# Patient Record
(demographics unavailable — no encounter records)

---

## 2024-10-23 NOTE — HISTORY OF PRESENT ILLNESS
[de-identified] : COUGH / FEVER  [FreeTextEntry6] : Fever x5 days and non-stop coughing.  Went to urgent care diagnosed with viral uri symptoms.  Tolerating po intake. Normal UOP.  Mother treated for bronchitis

## 2024-10-23 NOTE — HISTORY OF PRESENT ILLNESS
[de-identified] : COUGH / FEVER  [FreeTextEntry6] : Fever x5 days and non-stop coughing.  Went to urgent care diagnosed with viral uri symptoms.  Tolerating po intake. Normal UOP.  Mother treated for bronchitis

## 2024-10-23 NOTE — DISCUSSION/SUMMARY
[FreeTextEntry1] : Sent for Chest X-Ray - Results in Right Lobe Pneumonia  RVP Sent out  Antibiotics as prescribed Drink plenty of fluids  Discussed use/avoidance of OTC cough meds. Reviewed reasons to seek immediate care and follow up if condition worsens or does not improve.  Return for follow up in 1 week. Discussed signs and symptoms that would required immediate care. Mother expressed understanding. All questions answered. Caretaker understands and agrees with plan. If (+) new or worsening symptoms or (+) parental concern - return to office

## 2025-01-31 NOTE — PHYSICAL EXAM
[Acute Distress] : no acute distress [Alert] : alert [EOMI] : grossly EOMI [Conjuctival Injection] : conjunctival injection [Increased Tearing] : increased tearing [Discharge] : discharge [Supple] : supple [FROM] : full passive range of motion [Clear to Auscultation Bilaterally] : clear to auscultation bilaterally [Regular Rate and Rhythm] : regular rate and rhythm [Normal S1, S2 audible] : normal S1, S2 audible [Murmur] : no murmur [Soft] : soft [Tender] : nontender [Distended] : nondistended [Normal Bowel Sounds] : normal bowel sounds [Hepatosplenomegaly] : no hepatosplenomegaly [No Abnormal Lymph Nodes Palpated] : no abnormal lymph nodes palpated [Moves All Extremities x 4] : moves all extremities x4 [Warm, Well Perfused x4] : warm, well perfused x4 [Capillary Refill <2s] : capillary refill < 2s [Normotonic] : normotonic [NL] : warm, clear [Warm] : warm [Clear] : clear [FreeTextEntry5] : lexis

## 2025-01-31 NOTE — HISTORY OF PRESENT ILLNESS
[de-identified] : EYE REDNESS/ COUGH  [FreeTextEntry6] : PER MOM, PT HAS BEEN SICK FOR A FEW DAYS  EYES CRUSTED STARTING LAST NIGHT  TYNEOL & MUCINEX GIVEN YESTERDAY  NO MEDS TODAY  NKDA

## 2025-01-31 NOTE — DISCUSSION/SUMMARY
[FreeTextEntry1] : 8 yr old with conjunctivitits polytrim as pres supp care good handwashing notify office if s/s persist or worsen [] : The components of the vaccine(s) to be administered today are listed in the plan of care. The disease(s) for which the vaccine(s) are intended to prevent and the risks have been discussed with the caretaker.  The risks are also included in the appropriate vaccination information statements which have been provided to the patient's caregiver.  The caregiver has given consent to vaccinate.

## 2025-01-31 NOTE — REVIEW OF SYSTEMS
[Eye Discharge] : eye discharge [Eye Redness] : eye redness [Itchy Eyes] : itchy eyes [Nasal Congestion] : nasal congestion [Negative] : Genitourinary

## 2025-03-10 NOTE — HISTORY OF PRESENT ILLNESS
[de-identified] : HEADACHE/ STOMACH ACHE AND VOMITING  [FreeTextEntry6] : PER MOM, PT BEGAN C/O HEADACHE ON THURSDAY AND CONTINUED INTO WEEKEND C/O STOMACH ACHE ON SUNDAY AND VOMITTED ONCE TODAY  TMAX  PER MOM NO MEDS GIVEN TODAY  NKDA

## 2025-03-10 NOTE — DISCUSSION/SUMMARY
[FreeTextEntry1] : doing well  exam   normal only  finding   red   and  injected  throat   send  for   rapid and  culture

## 2025-06-13 NOTE — HISTORY OF PRESENT ILLNESS
[Mother] : mother [whole] : whole milk [Fruit] : fruit [Vegetables] : vegetables [Meat] : meat [Grains] : grains [Eggs] : eggs [Fish] : fish [Dairy] : dairy [Normal] : Normal [Brushing teeth twice/d] : brushing teeth twice per day [Yes] : Patient goes to dentist yearly [Playtime (60 min/d)] : playtime 60 min a day [Appropiate parent-child-sibling interaction] : appropriate parent-child-sibling interaction [Has Friends] : has friends [Grade ___] : Grade [unfilled] [Adequate social interactions] : adequate social interactions [Adequate behavior] : adequate behavior [Adequate performance] : adequate performance [Adequate attention] : adequate attention [No difficulties with Homework] : no difficulties with homework [No] : No cigarette smoke exposure [Appropriately restrained in motor vehicle] : appropriately restrained in motor vehicle [Supervised outdoor play] : supervised outdoor play [Supervised around water] : supervised around water [Wears helmet and pads] : wears helmet and pads [Parent knows child's friends] : parent knows child's friends [Parent discusses safety rules regarding adults] : parent discusses safety rules regarding adults [Monitored computer use] : monitored computer use [Family discusses home emergency plan] : family discusses home emergency plan [Exposure to electronic nicotine delivery system] : No exposure to electronic nicotine delivery system [Up to date] : Up to date [YES] : Yes [Are there any unlocked firearms stored in your household?] : No unlocked firearms in the household. [Are there any firearms stored in your household that are loaded?] : No firearms are stored in the household loaded. [Are there any children in your household?] : There are children in the household. [Has anyone in the household been feeling low/depressed/been struggling?] : No one in the household has been feeling low/depressed/been struggling. [Have you attended a firearm safety workshop or class?] : A firearm safety workshop or class has been attended.

## 2025-06-13 NOTE — PHYSICAL EXAM
[Alert] : alert [No Acute Distress] : no acute distress [Normocephalic] : normocephalic [Conjunctivae with no discharge] : conjunctivae with no discharge [PERRL] : PERRL [EOMI Bilateral] : EOMI bilateral [Auricles Well Formed] : auricles well formed [Clear Tympanic membranes with present light reflex and bony landmarks] : clear tympanic membranes with present light reflex and bony landmarks [No Discharge] : no discharge [Nares Patent] : nares patent [Pink Nasal Mucosa] : pink nasal mucosa [Palate Intact] : palate intact [Nonerythematous Oropharynx] : nonerythematous oropharynx [Supple, full passive range of motion] : supple, full passive range of motion [No Palpable Masses] : no palpable masses [Symmetric Chest Rise] : symmetric chest rise [Clear to Auscultation Bilaterally] : clear to auscultation bilaterally [Regular Rate and Rhythm] : regular rate and rhythm [Normal S1, S2 present] : normal S1, S2 present [No Murmurs] : no murmurs [+2 Femoral Pulses] : +2 femoral pulses [Soft] : soft [NonTender] : non tender [Non Distended] : non distended [Normoactive Bowel Sounds] : normoactive bowel sounds [No Hepatomegaly] : no hepatomegaly [No Splenomegaly] : no splenomegaly [Yung: _____] : Yung [unfilled] [Testicles Descended Bilaterally] : testicles descended bilaterally [Patent] : patent [No fissures] : no fissures [No Abnormal Lymph Nodes Palpated] : no abnormal lymph nodes palpated [No Gait Asymmetry] : no gait asymmetry [No pain or deformities with palpation of bone, muscles, joints] : no pain or deformities with palpation of bone, muscles, joints [Normal Muscle Tone] : normal muscle tone [Straight] : straight [+2 Patella DTR] : +2 patella DTR [Cranial Nerves Grossly Intact] : cranial nerves grossly intact [No Rash or Lesions] : no rash or lesions

## 2025-06-13 NOTE — DISCUSSION/SUMMARY
[FreeTextEntry1] : Well 8 year old Discussed growth and development: normal Discussed safety/anticipatory guidance Reviewed immunization forecast and discussed need for any vaccines, reviewed side effects and VIS Next PE: 1 year  Discussed and/or provided information on the following: SCHOOLS: Adaptation to school; school problems (behavior or learning issues); school performance/progress; involvement in school activities and after-school programs; bullying; parental involvement; IEP or special education services DEVELOPMENT/MENTAL HEALTH: [place]; self-esteem; social interactions; establishing rules and consequences; temper problems; managing and resolving conflicts; puberty/pubertal development NUTRITION: Healthy weight; appropriate food intake; adequate calcium; water instead of soda, diet review - seems well balanced PHYSICAL ACTIVITY: Adequate physical activity in organized sports, after-school programs, fun activities; limits on screen time ORAL HEALTH: Regular visits with dentist; daily brushing and flossing; adequate fluoride SAFETY: Knowing child's friends and families; supervision with friends; safety belts/booster seats; helmets; playground safety; sports safety; swimming safety; sunscreen; smoke-free home/vehicles; guns; careful monitoring of computer use (games, Internet, email)